# Patient Record
Sex: MALE | Race: WHITE | NOT HISPANIC OR LATINO | Employment: STUDENT | URBAN - METROPOLITAN AREA
[De-identification: names, ages, dates, MRNs, and addresses within clinical notes are randomized per-mention and may not be internally consistent; named-entity substitution may affect disease eponyms.]

---

## 2023-08-09 ENCOUNTER — OFFICE VISIT (OUTPATIENT)
Dept: GASTROENTEROLOGY | Facility: CLINIC | Age: 23
End: 2023-08-09
Payer: COMMERCIAL

## 2023-08-09 VITALS
DIASTOLIC BLOOD PRESSURE: 70 MMHG | SYSTOLIC BLOOD PRESSURE: 110 MMHG | TEMPERATURE: 96 F | HEIGHT: 70 IN | HEART RATE: 71 BPM | WEIGHT: 199.2 LBS | OXYGEN SATURATION: 96 % | BODY MASS INDEX: 28.52 KG/M2

## 2023-08-09 DIAGNOSIS — R19.4 CHANGE IN BOWEL HABITS: ICD-10-CM

## 2023-08-09 DIAGNOSIS — R10.9 ABDOMINAL PRESSURE: Chronic | ICD-10-CM

## 2023-08-09 DIAGNOSIS — K58.0 IRRITABLE BOWEL SYNDROME WITH DIARRHEA: Primary | Chronic | ICD-10-CM

## 2023-08-09 DIAGNOSIS — R10.9 ABDOMINAL CRAMPING: Chronic | ICD-10-CM

## 2023-08-09 PROCEDURE — 99204 OFFICE O/P NEW MOD 45 MIN: CPT | Performed by: NURSE PRACTITIONER

## 2023-08-09 RX ORDER — ERGOCALCIFEROL (VITAMIN D2) 10 MCG
400 TABLET ORAL DAILY
COMMUNITY

## 2023-08-09 NOTE — PROGRESS NOTES
"Chief Complaint   Patient presents with    change in bowel habits         History of Present Illness  23-year-old male presents the office today for evaluation of change in bowel habits. He reports that a couple of months ago he had a bad pressure on the sides of his abdomen accompanied with diarrhea. Now he feels the sensation that he has to have a BM, but sometimes he has a BM and other times he has to wait a significant amount of time before he has a BM. He reports that at times he does not feel that he fully evacuates. He feels as if he is holding in pressure at times. He cannot sit comfortably for period of time and cannot get comfortable. He reports some cramping. He denies any rectal bleeding aside from having to wipe too much-which is rare per his report. He reports some abdominal pressure but no pain. He reports having a CT scan a couple of months ago that was normal. This was performed at [facility] and we will request.     He started a fiber supplement about 1-2 months ago consistently with no change in symptoms but has continued use. He reports having a BM more than once per day and can have up to 2-4 BMs per day. Stool consistency can be normal to loose. He has not tried a probiotic. He denies any family history of colon cancer or colon polyps. He reports a history of IBS in a half sister. He denies any weight loss. He reports an increase in his exercise and is going to the gym 3-4 times per week. He has been trying to eat a little healthier.     He denies any upper GI symptoms such as nausea, vomiting, heartburn, reflux, or dysphagia.        Result Review :       COVID-19,[facility] ROUTINE, NP/OP SWAB IN TRANSPORT MEDIA OR ESWAB 72 HR TAT - , (09/02/2020 10:03)     Vital Signs:   /70   Pulse 71   Temp 96 øF (35.6 øC)   Ht 177.8 cm (70\")   Wt 90.4 kg (199 lb 3.2 oz)   SpO2 96%   BMI 28.58 kg/mý     Body mass index is 28.58 kg/mý.     Physical Exam  Vitals reviewed.   Constitutional:     "   Appearance: Normal appearance.   HENT:      Head: Normocephalic.      Nose: Nose normal.      Mouth/Throat:      Mouth: Mucous membranes are moist.   Eyes:      General: No scleral icterus.     Extraocular Movements: Extraocular movements intact.   Cardiovascular:      Rate and Rhythm: Normal rate and regular rhythm.      Pulses: Normal pulses.      Heart sounds: Normal heart sounds.   Pulmonary:      Effort: Pulmonary effort is normal. No respiratory distress.      Breath sounds: Normal breath sounds.   Abdominal:      General: Abdomen is flat. Bowel sounds are normal. There is no distension.      Palpations: Abdomen is soft. There is no mass.      Tenderness: There is no abdominal tenderness. There is no guarding.   Musculoskeletal:         General: Normal range of motion.      Cervical back: Normal range of motion and neck supple.   Skin:     General: Skin is warm and dry.      Coloration: Skin is not jaundiced.   Neurological:      General: No focal deficit present.      Mental Status: He is alert and oriented to person, place, and time.   Psychiatric:         Mood and Affect: Mood normal.         Behavior: Behavior normal.         Thought Content: Thought content normal.         Judgment: Judgment normal.     Assessment and Plan    Diagnoses and all orders for this visit:    1. Irritable bowel syndrome with diarrhea (Primary)    2. Abdominal pressure    3. Abdominal cramping    4. Change in bowel habits    Other orders  -     riFAXIMin (Xifaxan) 550 MG tablet; Take 1 tablet by mouth 3 (Three) Times a Day for 14 days.  Dispense: 42 tablet; Refill: 0           Patient Instructions   Continue fiber supplement.     2.   We will place you on a course of Xifaxan. This is a gut specific antibiotic that helps in the treatment of irritable bowel syndrome with diarrhea. You will take 1 tab 3 x daily x 14 days. Coupon copay card provided. Please visit [employer] to fill out the required form to receive your medication  for $0 or at a very reduced cost. You will then need to give your coupon card to the pharmacy staff when picking up your medication.     3.  If Xifaxan is not covered we will plan to trial dicyclomine.    4.   Recommend office follow up in 3 months for reassessment of symptoms or sooner should symptoms worsen or fail to improve.       Discussion  Based upon the patient's report of symptoms it appears that he has some irritable bowel syndrome at play.  We will place him on a course of Xifaxan for management of symptoms.  We have also recommend he continue daily fiber supplement.  If for some reason Xifaxan is not covered we would recommend that he start dicyclomine.  Plan for office follow-up in 3 months for reassessment of symptoms, or sooner should symptoms worsen or fail to improve.  We will also request the patient's CT scan from [facility] imaging for our review.  Patient verbalized understanding of above plan of care and is in agreement.  All questions answered and support provided.    EMR Dragon/Transcription Disclaimer:  This document has been Dictated utilizing Dragon dictation.

## 2023-08-09 NOTE — PATIENT INSTRUCTIONS
Continue fiber supplement.     2.   We will place you on a course of Xifaxan. This is a gut specific antibiotic that helps in the treatment of irritable bowel syndrome with diarrhea. You will take 1 tab 3 x daily x 14 days. Coupon copay card provided. Please visit [employer] to fill out the required form to receive your medication for $0 or at a very reduced cost. You will then need to give your coupon card to the pharmacy staff when picking up your medication.     3.  If Xifaxan is not covered we will plan to trial dicyclomine.    4.   Recommend office follow up in 3 months for reassessment of symptoms or sooner should symptoms worsen or fail to improve.

## 2023-08-21 RX ORDER — RIFAXIMIN 550 MG/1
TABLET ORAL
Qty: 42 TABLET | Refills: 0 | OUTPATIENT
Start: 2023-08-21

## 2023-09-13 ENCOUNTER — TELEPHONE (OUTPATIENT)
Dept: GASTROENTEROLOGY | Facility: CLINIC | Age: 23
End: 2023-09-13
Payer: COMMERCIAL

## 2023-09-13 NOTE — TELEPHONE ENCOUNTER
Hub staff attempted to follow warm transfer process and was unsuccessful     Caller: NIMISHA - DR MENDOZA' OFFICE    Relationship to patient:     Best call back number: [phone]    Patient is needing: DR MENDOZA IS NEEDING DR VANEGAS TO CALL HIM ON HIS CELL # [phone] REGARDING PT: GRACIE THACKER. PT SAW TEOFILO CALISTA AT [place] [facility] AND PRESCRIBED HIM MEDS THAT HE'S STILL HAVING ISSUES WITH. DR MENDOZA IS WANTING TO SEE IF DR VANEGAS CAN SEE HIM ASAP.

## 2023-09-13 NOTE — TELEPHONE ENCOUNTER
I don't have anywhere to work this gentleman in unfortunately- Dr Woodruff supervises Barbra Martin and would be the MD to work this patient in with typically as [place] is a separate office

## 2023-09-13 NOTE — TELEPHONE ENCOUNTER
Called Dr Singletary ' office and spoke with Kandice and advised of Dr Arroyo's note. Number to Dr Woodruff's office provided [phone]

## 2023-10-10 ENCOUNTER — OFFICE VISIT (OUTPATIENT)
Dept: GASTROENTEROLOGY | Facility: CLINIC | Age: 23
End: 2023-10-10
Payer: COMMERCIAL

## 2023-10-10 ENCOUNTER — PREP FOR SURGERY (OUTPATIENT)
Dept: SURGERY | Facility: SURGERY CENTER | Age: 23
End: 2023-10-10
Payer: COMMERCIAL

## 2023-10-10 VITALS
BODY MASS INDEX: 27.66 KG/M2 | DIASTOLIC BLOOD PRESSURE: 64 MMHG | HEART RATE: 70 BPM | OXYGEN SATURATION: 98 % | HEIGHT: 70 IN | WEIGHT: 193.2 LBS | TEMPERATURE: 97.3 F | SYSTOLIC BLOOD PRESSURE: 112 MMHG

## 2023-10-10 DIAGNOSIS — R19.4 CHANGE IN BOWEL HABITS: Primary | ICD-10-CM

## 2023-10-10 DIAGNOSIS — R10.9 ABDOMINAL PRESSURE: ICD-10-CM

## 2023-10-10 PROCEDURE — 99214 OFFICE O/P EST MOD 30 MIN: CPT | Performed by: INTERNAL MEDICINE

## 2023-10-10 RX ORDER — CYANOCOBALAMIN, ISOPROPYL ALCOHOL 1000MCG/ML
KIT INJECTION
COMMUNITY

## 2023-10-10 RX ORDER — SODIUM CHLORIDE 0.9 % (FLUSH) 0.9 %
10 SYRINGE (ML) INJECTION AS NEEDED
OUTPATIENT
Start: 2023-10-10

## 2023-10-10 RX ORDER — SODIUM CHLORIDE, SODIUM LACTATE, POTASSIUM CHLORIDE, CALCIUM CHLORIDE 600; 310; 30; 20 MG/100ML; MG/100ML; MG/100ML; MG/100ML
30 INJECTION, SOLUTION INTRAVENOUS CONTINUOUS PRN
OUTPATIENT
Start: 2023-10-10

## 2023-10-10 RX ORDER — SODIUM CHLORIDE 0.9 % (FLUSH) 0.9 %
3 SYRINGE (ML) INJECTION EVERY 12 HOURS SCHEDULED
OUTPATIENT
Start: 2023-10-10

## 2023-10-10 NOTE — PROGRESS NOTES
"Chief Complaint   Patient presents with    Change in bowel habits           History of Present Illness  Patient today for follow-up.  Patient has a history of abdominal discomfort.  He had a CT [place] which was reportedly negative.    Patient presents today with concerns about change in bowel habits. Symptoms began around 6 months ago and have worsened. Patient reports he has had some left lower quadrant abdominal pressure. Reports tenesmus. Reports frequent urge to have a bowel movement. Denies blood or mucus in stools. Denies diarrhea.    His father is with him for today's visit and reports his symptoms are disrupting him at work. His father reports patient has a history of OCD. He does not feel anxiety worsens his symptoms.    He tried fiber and probiotics. He reports no change in symptoms with these treatments.    Denies nausea or vomiting. Denies heartburn.    Denies family history of GI Disorders. No prior abdominal surgeries.       Result Review :       Progress Notes by Barbra Martin APRN (08/09/2023 08:15)         Vital Signs:   /64   Pulse 70   Temp 97.3 øF (36.3 øC)   Ht 177.8 cm (70\")   Wt 87.6 kg (193 lb 3.2 oz)   SpO2 98%   BMI 27.72 kg/mý     Body mass index is 27.72 kg/mý.     Physical Exam  Constitutional:       Appearance: Normal appearance.   Pulmonary:      Effort: Pulmonary effort is normal.   Abdominal:      Palpations: Abdomen is soft.   Neurological:      Mental Status: He is oriented to person, place, and time.           Assessment and Plan    Diagnoses and all orders for this visit:    1. Change in bowel habits (Primary)    2. Abdominal pressure         BRIEF SUMMARY  Patient here for follow-up.  He has continued issues with feeling of incomplete defecation and abdominal pressure in the left side of the abdomen.  We will proceed with a colonoscopy to rule out any structural abnormality.  He will continue fiber.    I have reviewed and confirmed the accuracy of the HPI " and Assessment and Plan as documented by the APRN NICHO Jang        Follow Up   Return for Follow up to review results after testing complete.    Patient Instructions   Schedule colonoscopy for further evaluation.       Documentation by Nathalie TORRE acting as a scribe in the following sections on behalf of the billable provider: HPI, ROS, assessment, & plan.

## 2023-10-10 NOTE — H&P (VIEW-ONLY)
"Chief Complaint   Patient presents with    Change in bowel habits           History of Present Illness  Patient today for follow-up.  Patient has a history of abdominal discomfort.  He had a CT [place] which was reportedly negative.    Patient presents today with concerns about change in bowel habits. Symptoms began around 6 months ago and have worsened. Patient reports he has had some left lower quadrant abdominal pressure. Reports tenesmus. Reports frequent urge to have a bowel movement. Denies blood or mucus in stools. Denies diarrhea.    His father is with him for today's visit and reports his symptoms are disrupting him at work. His father reports patient has a history of OCD. He does not feel anxiety worsens his symptoms.    He tried fiber and probiotics. He reports no change in symptoms with these treatments.    Denies nausea or vomiting. Denies heartburn.    Denies family history of GI Disorders. No prior abdominal surgeries.       Result Review :       Progress Notes by Barbra Martin APRN (08/09/2023 08:15)         Vital Signs:   /64   Pulse 70   Temp 97.3 °F (36.3 °C)   Ht 177.8 cm (70\")   Wt 87.6 kg (193 lb 3.2 oz)   SpO2 98%   BMI 27.72 kg/m²     Body mass index is 27.72 kg/m².     Physical Exam  Constitutional:       Appearance: Normal appearance.   Pulmonary:      Effort: Pulmonary effort is normal.   Abdominal:      Palpations: Abdomen is soft.   Neurological:      Mental Status: He is oriented to person, place, and time.           Assessment and Plan    Diagnoses and all orders for this visit:    1. Change in bowel habits (Primary)    2. Abdominal pressure         BRIEF SUMMARY  Patient here for follow-up.  He has continued issues with feeling of incomplete defecation and abdominal pressure in the left side of the abdomen.  We will proceed with a colonoscopy to rule out any structural abnormality.  He will continue fiber.    I have reviewed and confirmed the accuracy of the HPI " and Assessment and Plan as documented by the APRN NICHO Jang        Follow Up   Return for Follow up to review results after testing complete.    Patient Instructions   Schedule colonoscopy for further evaluation.       Documentation by Nathalie TORRE acting as a scribe in the following sections on behalf of the billable provider: HPI, ROS, assessment, & plan.

## 2023-10-12 ENCOUNTER — TELEPHONE (OUTPATIENT)
Dept: GASTROENTEROLOGY | Facility: CLINIC | Age: 23
End: 2023-10-12
Payer: COMMERCIAL

## 2023-10-16 ENCOUNTER — TELEPHONE (OUTPATIENT)
Dept: GASTROENTEROLOGY | Facility: CLINIC | Age: 23
End: 2023-10-16
Payer: COMMERCIAL

## 2023-10-16 PROBLEM — R10.9 ABDOMINAL PRESSURE: Status: ACTIVE | Noted: 2023-10-10

## 2023-10-16 PROBLEM — R19.4 CHANGE IN BOWEL HABITS: Status: ACTIVE | Noted: 2023-10-10

## 2023-10-16 NOTE — TELEPHONE ENCOUNTER
I spoke to the mother and rescheduled her son/the patient to 10/19/2023 at 12:30p with Dr. Woodruff for cls.

## 2023-10-16 NOTE — TELEPHONE ENCOUNTER
Provider: DR CARD    Caller: GRACIE THACKER    Relationship to Patient: FATHER    Phone Number: [phone] (KATIE)    Reason for Call: PT DAD CALLED IN TO SEE IF WE ARE ABLE TO PROVIDE PT WITH NOTE TO BE OFF WORK FOR PROCEDURE, WANTING UPDATE. AMARI GAVE WIFE/PT MOM.

## 2023-10-16 NOTE — TELEPHONE ENCOUNTER
CLS is on 10/19/2023 at 12:45 pm with arrival time at 11:45 am. [facility]. For the letter.

## 2023-10-17 NOTE — TELEPHONE ENCOUNTER
Correction: Arrival time 11:15 am.  I called patient to inform about letter being ready, no answer. I left a VM.

## 2023-10-19 ENCOUNTER — ANESTHESIA (OUTPATIENT)
Dept: SURGERY | Facility: SURGERY CENTER | Age: 23
End: 2023-10-19
Payer: COMMERCIAL

## 2023-10-19 ENCOUNTER — HOSPITAL ENCOUNTER (OUTPATIENT)
Facility: SURGERY CENTER | Age: 23
Setting detail: HOSPITAL OUTPATIENT SURGERY
Discharge: HOME | End: 2023-10-19
Attending: INTERNAL MEDICINE | Admitting: INTERNAL MEDICINE
Payer: COMMERCIAL

## 2023-10-19 ENCOUNTER — ANESTHESIA EVENT (OUTPATIENT)
Dept: SURGERY | Facility: SURGERY CENTER | Age: 23
End: 2023-10-19
Payer: COMMERCIAL

## 2023-10-19 VITALS
TEMPERATURE: 98.6 F | SYSTOLIC BLOOD PRESSURE: 100 MMHG | BODY MASS INDEX: 27.06 KG/M2 | RESPIRATION RATE: 16 BRPM | OXYGEN SATURATION: 95 % | HEART RATE: 61 BPM | HEIGHT: 70 IN | DIASTOLIC BLOOD PRESSURE: 50 MMHG | WEIGHT: 189 LBS

## 2023-10-19 DIAGNOSIS — R10.9 ABDOMINAL PRESSURE: ICD-10-CM

## 2023-10-19 DIAGNOSIS — R19.4 CHANGE IN BOWEL HABITS: ICD-10-CM

## 2023-10-19 PROCEDURE — 45380 COLONOSCOPY AND BIOPSY: CPT | Performed by: INTERNAL MEDICINE

## 2023-10-19 PROCEDURE — 88305 TISSUE EXAM BY PATHOLOGIST: CPT | Performed by: INTERNAL MEDICINE

## 2023-10-19 PROCEDURE — 25010000002 PROPOFOL 1000 MG/100ML EMULSION: Performed by: ANESTHESIOLOGY

## 2023-10-19 PROCEDURE — 25810000003 LACTATED RINGERS PER 1000 ML: Performed by: ANESTHESIOLOGY

## 2023-10-19 PROCEDURE — 25010000002 PROPOFOL 10 MG/ML EMULSION: Performed by: ANESTHESIOLOGY

## 2023-10-19 RX ORDER — SODIUM CHLORIDE, SODIUM LACTATE, POTASSIUM CHLORIDE, CALCIUM CHLORIDE 600; 310; 30; 20 MG/100ML; MG/100ML; MG/100ML; MG/100ML
INJECTION, SOLUTION INTRAVENOUS CONTINUOUS PRN
Status: DISCONTINUED | OUTPATIENT
Start: 2023-10-19 | End: 2023-10-19 | Stop reason: SURG

## 2023-10-19 RX ORDER — SODIUM CHLORIDE 0.9 % (FLUSH) 0.9 %
10 SYRINGE (ML) INJECTION AS NEEDED
Status: DISCONTINUED | OUTPATIENT
Start: 2023-10-19 | End: 2023-10-19 | Stop reason: HOSPADM

## 2023-10-19 RX ORDER — PROPOFOL 10 MG/ML
INJECTION, EMULSION INTRAVENOUS AS NEEDED
Status: DISCONTINUED | OUTPATIENT
Start: 2023-10-19 | End: 2023-10-19 | Stop reason: SURG

## 2023-10-19 RX ORDER — LIDOCAINE HYDROCHLORIDE 20 MG/ML
INJECTION, SOLUTION INFILTRATION; PERINEURAL AS NEEDED
Status: DISCONTINUED | OUTPATIENT
Start: 2023-10-19 | End: 2023-10-19 | Stop reason: SURG

## 2023-10-19 RX ORDER — SODIUM CHLORIDE 0.9 % (FLUSH) 0.9 %
3 SYRINGE (ML) INJECTION EVERY 12 HOURS SCHEDULED
Status: DISCONTINUED | OUTPATIENT
Start: 2023-10-19 | End: 2023-10-19 | Stop reason: HOSPADM

## 2023-10-19 RX ORDER — SODIUM CHLORIDE, SODIUM LACTATE, POTASSIUM CHLORIDE, CALCIUM CHLORIDE 600; 310; 30; 20 MG/100ML; MG/100ML; MG/100ML; MG/100ML
30 INJECTION, SOLUTION INTRAVENOUS CONTINUOUS PRN
Status: DISCONTINUED | OUTPATIENT
Start: 2023-10-19 | End: 2023-10-19 | Stop reason: HOSPADM

## 2023-10-19 RX ADMIN — PROPOFOL 160 MG: 10 INJECTION, EMULSION INTRAVENOUS at 14:15

## 2023-10-19 RX ADMIN — LIDOCAINE HYDROCHLORIDE 50 MG: 20 INJECTION, SOLUTION INFILTRATION; PERINEURAL at 14:14

## 2023-10-19 RX ADMIN — PROPOFOL 300 MCG/KG/MIN: 10 INJECTION, EMULSION INTRAVENOUS at 14:15

## 2023-10-19 RX ADMIN — SODIUM CHLORIDE, SODIUM LACTATE, POTASSIUM CHLORIDE, AND CALCIUM CHLORIDE: .6; .31; .03; .02 INJECTION, SOLUTION INTRAVENOUS at 14:13

## 2023-10-19 NOTE — ANESTHESIA POSTPROCEDURE EVALUATION
"Patient: Deshawn Villafana    Procedure Summary       Date: 10/19/23 Room / Location: [facility]    Anesthesia Start: 1413 Anesthesia Stop: 1430    Procedure: COLONOSCOPY Diagnosis:       Change in bowel habits      Abdominal pressure      (Change in bowel habits [R19.4])      (Abdominal pressure [R10.9])    Surgeons: Bill Woodruff MD Provider: Evelina Bhandari MD    Anesthesia Type: MAC ASA Status: 1            Anesthesia Type: MAC    Vitals  Vitals Value Taken Time   /50 10/19/23 1446   Temp 37 °C (98.6 °F) 10/19/23 1430   Pulse 61 10/19/23 1447   Resp 16 10/19/23 1445   SpO2 97 % 10/19/23 1447   Vitals shown include unfiled device data.        Post Anesthesia Care and Evaluation    Patient location during evaluation: bedside  Patient participation: complete - patient participated  Level of consciousness: awake  Pain management: adequate    Airway patency: patent  Anesthetic complications: No anesthetic complications    Cardiovascular status: acceptable  Respiratory status: acceptable  Hydration status: acceptable    Comments: /50 (BP Location: Left arm, Patient Position: Lying)   Pulse 61   Temp 37 °C (98.6 °F) (Temporal)   Resp 16   Ht 177.8 cm (70\")   Wt 85.7 kg (189 lb)   SpO2 95%   BMI 27.12 kg/m²     "

## 2023-10-19 NOTE — ANESTHESIA PREPROCEDURE EVALUATION
Anesthesia Evaluation     Patient summary reviewed and Nursing notes reviewed   NPO Solid Status: > 8 hours  NPO Liquid Status: > 2 hours           Airway   Mallampati: II  TM distance: >3 FB  Neck ROM: full  Dental      Pulmonary - negative pulmonary ROS   Cardiovascular - negative cardio ROS  Exercise tolerance: good (4-7 METS)    Rhythm: regular  Rate: normal        Neuro/Psych- negative ROS  GI/Hepatic/Renal/Endo - negative ROS     Musculoskeletal (-) negative ROS    Abdominal    Substance History - negative use     OB/GYN negative ob/gyn ROS         Other                    Anesthesia Plan    ASA 1     MAC     intravenous induction     Anesthetic plan, risks, benefits, and alternatives have been provided, discussed and informed consent has been obtained with: patient.    CODE STATUS:

## 2023-10-23 ENCOUNTER — TELEPHONE (OUTPATIENT)
Dept: GASTROENTEROLOGY | Facility: CLINIC | Age: 23
End: 2023-10-23
Payer: COMMERCIAL

## 2023-10-23 LAB
LAB AP CASE REPORT: NORMAL
LAB AP CLINICAL INFORMATION: NORMAL
PATH REPORT.FINAL DX SPEC: NORMAL
PATH REPORT.GROSS SPEC: NORMAL

## 2023-10-23 NOTE — TELEPHONE ENCOUNTER
Caller: KALPESH THACKER    Relationship: Mother    Best call back number: [phone]     Caller requesting test results: MOTHER    What test was performed: COLONOSCOPY     When was the test performed: 10/19/23    Where was the test performed: [facility]

## 2023-10-23 NOTE — TELEPHONE ENCOUNTER
Patient left a Voice Message requesting a call back with last scope results.    [phone]    If pt does not answer (probably at work) his mother, who left a VM too, can be reached at [phone].

## 2023-10-26 ENCOUNTER — TELEPHONE (OUTPATIENT)
Dept: GASTROENTEROLOGY | Facility: CLINIC | Age: 23
End: 2023-10-26
Payer: COMMERCIAL

## 2023-10-26 DIAGNOSIS — K62.89 RECTAL DISCOMFORT: Primary | ICD-10-CM

## 2023-10-26 RX ORDER — HYDROCORTISONE ACETATE 25 MG/1
25 SUPPOSITORY RECTAL 2 TIMES DAILY PRN
Qty: 20 SUPPOSITORY | Refills: 1 | Status: SHIPPED | OUTPATIENT
Start: 2023-10-26

## 2023-10-26 NOTE — TELEPHONE ENCOUNTER
Per Dr. Woodruff:    I had considered Canasa suppositories but the biopsies were negative for inflammation.  He may utilize Anusol HC suppositories or Analpram-HC for rectal discomfort.       Called patient and discussed note from Dr. Woodruff. Patient agreeable to plan of care. Rx for Anusol HC suppositories sent to patient's pharmacy/pending approval. david

## 2023-10-26 NOTE — TELEPHONE ENCOUNTER
"Patient called, asking about \"a medicine\" that was supposed to be called in for him after his colonoscopy. He said it was mentioned to his mom the day of his procedure, but he said there was never a name of a medication mentioned. His answers were vague. After questioning patient regarding the reason for the request for medicine, patient confirmed that it is for rectal discomfort; he would not describe the discomfort, but said certain \"factors\" affect it (he did confirm that having a bowel movement was a factor that affected it). He would like to know what medicine was recommended. Please advise.  "

## 2024-02-28 ENCOUNTER — TELEPHONE (OUTPATIENT)
Dept: GASTROENTEROLOGY | Facility: CLINIC | Age: 24
End: 2024-02-28
Payer: COMMERCIAL

## 2024-02-28 DIAGNOSIS — K64.9 HEMORRHOIDS, UNSPECIFIED HEMORRHOID TYPE: Primary | ICD-10-CM

## 2024-02-28 NOTE — TELEPHONE ENCOUNTER
Pt's mother called on [facility].  Requesting to have band procedure for hemorrhoids. Referral needed. Thank you

## 2024-04-08 ENCOUNTER — OFFICE VISIT (OUTPATIENT)
Dept: GASTROENTEROLOGY | Facility: CLINIC | Age: 24
End: 2024-04-08
Payer: COMMERCIAL

## 2024-04-08 VITALS
DIASTOLIC BLOOD PRESSURE: 70 MMHG | SYSTOLIC BLOOD PRESSURE: 108 MMHG | OXYGEN SATURATION: 97 % | HEART RATE: 72 BPM | BODY MASS INDEX: 27.53 KG/M2 | TEMPERATURE: 98.4 F | HEIGHT: 70 IN | WEIGHT: 192.3 LBS

## 2024-04-08 DIAGNOSIS — K64.9 HEMORRHOIDS, UNSPECIFIED HEMORRHOID TYPE: Primary | ICD-10-CM

## 2024-04-08 DIAGNOSIS — K64.9 HEMORRHOIDS, UNSPECIFIED HEMORRHOID TYPE: ICD-10-CM

## 2024-04-08 DIAGNOSIS — R19.4 CHANGE IN BOWEL HABITS: ICD-10-CM

## 2024-04-08 DIAGNOSIS — K62.89 RECTAL DISCOMFORT: ICD-10-CM

## 2024-04-08 DIAGNOSIS — R14.0 BLOATING: Primary | ICD-10-CM

## 2024-04-08 DIAGNOSIS — R14.0 BLOATING: ICD-10-CM

## 2024-04-08 PROCEDURE — 99214 OFFICE O/P EST MOD 30 MIN: CPT | Performed by: INTERNAL MEDICINE

## 2024-04-08 RX ORDER — HYDROCORTISONE ACETATE PRAMOXINE HCL 2.5; 1 G/100G; G/100G
CREAM TOPICAL
Qty: 30 G | Refills: 1 | Status: SHIPPED | OUTPATIENT
Start: 2024-04-08

## 2024-04-30 DIAGNOSIS — R14.0 BLOATING: ICD-10-CM

## 2024-05-02 DIAGNOSIS — R19.4 CHANGE IN BOWEL HABITS: Primary | ICD-10-CM

## 2024-05-02 RX ORDER — METRONIDAZOLE 250 MG/1
250 TABLET ORAL 3 TIMES DAILY
Qty: 30 TABLET | Refills: 0 | Status: SHIPPED | OUTPATIENT
Start: 2024-05-02 | End: 2024-05-12

## 2024-05-17 DIAGNOSIS — R19.4 CHANGE IN BOWEL HABITS: ICD-10-CM

## 2024-05-17 RX ORDER — RIFAXIMIN 550 MG/1
TABLET ORAL
Qty: 42 TABLET | Refills: 0 | Status: SHIPPED | OUTPATIENT
Start: 2024-05-17

## 2025-02-14 ENCOUNTER — HOSPITAL ENCOUNTER (EMERGENCY)
Facility: HOSPITAL | Age: 25
Discharge: HOME | End: 2025-02-14
Attending: EMERGENCY MEDICINE
Payer: MEDICAID

## 2025-02-14 ENCOUNTER — APPOINTMENT (OUTPATIENT)
Dept: GENERAL RADIOLOGY | Facility: HOSPITAL | Age: 25
End: 2025-02-14
Payer: MEDICAID

## 2025-02-14 VITALS
HEART RATE: 81 BPM | SYSTOLIC BLOOD PRESSURE: 130 MMHG | HEIGHT: 70 IN | BODY MASS INDEX: 26.48 KG/M2 | RESPIRATION RATE: 18 BRPM | TEMPERATURE: 97.2 F | OXYGEN SATURATION: 98 % | WEIGHT: 185 LBS | DIASTOLIC BLOOD PRESSURE: 81 MMHG

## 2025-02-14 DIAGNOSIS — R07.89 ATYPICAL CHEST PAIN: Primary | ICD-10-CM

## 2025-02-14 LAB
ALBUMIN SERPL-MCNC: 4.5 G/DL (ref 3.5–5.2)
ALBUMIN/GLOB SERPL: 1.6 G/DL
ALP SERPL-CCNC: 67 U/L (ref 39–117)
ALT SERPL W P-5'-P-CCNC: 64 U/L (ref 1–41)
ANION GAP SERPL CALCULATED.3IONS-SCNC: 9 MMOL/L (ref 5–15)
AST SERPL-CCNC: 33 U/L (ref 1–40)
BASOPHILS # BLD AUTO: 0.04 10*3/MM3 (ref 0–0.2)
BASOPHILS NFR BLD AUTO: 0.6 % (ref 0–1.5)
BILIRUB SERPL-MCNC: 0.4 MG/DL (ref 0–1.2)
BUN SERPL-MCNC: 15 MG/DL (ref 6–20)
BUN/CREAT SERPL: 14.2 (ref 7–25)
CALCIUM SPEC-SCNC: 9.4 MG/DL (ref 8.6–10.5)
CHLORIDE SERPL-SCNC: 103 MMOL/L (ref 98–107)
CO2 SERPL-SCNC: 24 MMOL/L (ref 22–29)
CREAT SERPL-MCNC: 1.06 MG/DL (ref 0.76–1.27)
DEPRECATED RDW RBC AUTO: 38.2 FL (ref 37–54)
EGFRCR SERPLBLD CKD-EPI 2021: 99.9 ML/MIN/1.73
EOSINOPHIL # BLD AUTO: 0.3 10*3/MM3 (ref 0–0.4)
EOSINOPHIL NFR BLD AUTO: 4.3 % (ref 0.3–6.2)
ERYTHROCYTE [DISTWIDTH] IN BLOOD BY AUTOMATED COUNT: 12.5 % (ref 12.3–15.4)
GLOBULIN UR ELPH-MCNC: 2.8 GM/DL
GLUCOSE SERPL-MCNC: 95 MG/DL (ref 65–99)
HCT VFR BLD AUTO: 47.2 % (ref 37.5–51)
HGB BLD-MCNC: 16.1 G/DL (ref 13–17.7)
HOLD SPECIMEN: NORMAL
HOLD SPECIMEN: NORMAL
IMM GRANULOCYTES # BLD AUTO: 0.04 10*3/MM3 (ref 0–0.05)
IMM GRANULOCYTES NFR BLD AUTO: 0.6 % (ref 0–0.5)
LYMPHOCYTES # BLD AUTO: 2.52 10*3/MM3 (ref 0.7–3.1)
LYMPHOCYTES NFR BLD AUTO: 35.9 % (ref 19.6–45.3)
MCH RBC QN AUTO: 29 PG (ref 26.6–33)
MCHC RBC AUTO-ENTMCNC: 34.1 G/DL (ref 31.5–35.7)
MCV RBC AUTO: 84.9 FL (ref 79–97)
MONOCYTES # BLD AUTO: 0.52 10*3/MM3 (ref 0.1–0.9)
MONOCYTES NFR BLD AUTO: 7.4 % (ref 5–12)
NEUTROPHILS NFR BLD AUTO: 3.6 10*3/MM3 (ref 1.7–7)
NEUTROPHILS NFR BLD AUTO: 51.2 % (ref 42.7–76)
NRBC BLD AUTO-RTO: 0 /100 WBC (ref 0–0.2)
PLATELET # BLD AUTO: 187 10*3/MM3 (ref 140–450)
PMV BLD AUTO: 10 FL (ref 6–12)
POTASSIUM SERPL-SCNC: 3.6 MMOL/L (ref 3.5–5.2)
PROT SERPL-MCNC: 7.3 G/DL (ref 6–8.5)
QT INTERVAL: 388 MS
QTC INTERVAL: 404 MS
RBC # BLD AUTO: 5.56 10*6/MM3 (ref 4.14–5.8)
SODIUM SERPL-SCNC: 136 MMOL/L (ref 136–145)
TROPONIN T SERPL HS-MCNC: <6 NG/L
WBC NRBC COR # BLD AUTO: 7.02 10*3/MM3 (ref 3.4–10.8)
WHOLE BLOOD HOLD COAG: NORMAL
WHOLE BLOOD HOLD SPECIMEN: NORMAL

## 2025-02-14 PROCEDURE — 80053 COMPREHEN METABOLIC PANEL: CPT | Performed by: EMERGENCY MEDICINE

## 2025-02-14 PROCEDURE — 85025 COMPLETE CBC W/AUTO DIFF WBC: CPT | Performed by: EMERGENCY MEDICINE

## 2025-02-14 PROCEDURE — 93005 ELECTROCARDIOGRAM TRACING: CPT | Performed by: EMERGENCY MEDICINE

## 2025-02-14 PROCEDURE — 93010 ELECTROCARDIOGRAM REPORT: CPT | Performed by: INTERNAL MEDICINE

## 2025-02-14 PROCEDURE — 99283 EMERGENCY DEPT VISIT LOW MDM: CPT

## 2025-02-14 PROCEDURE — 93005 ELECTROCARDIOGRAM TRACING: CPT

## 2025-02-14 PROCEDURE — 84484 ASSAY OF TROPONIN QUANT: CPT | Performed by: EMERGENCY MEDICINE

## 2025-02-14 RX ORDER — FAMOTIDINE 20 MG/1
20 TABLET, FILM COATED ORAL 2 TIMES DAILY
Qty: 60 TABLET | Refills: 0 | Status: SHIPPED | OUTPATIENT
Start: 2025-02-14 | End: 2025-03-16

## 2025-02-14 RX ORDER — ASPIRIN 325 MG
325 TABLET ORAL ONCE
Status: DISCONTINUED | OUTPATIENT
Start: 2025-02-14 | End: 2025-02-14 | Stop reason: HOSPADM

## 2025-02-14 RX ORDER — SODIUM CHLORIDE 0.9 % (FLUSH) 0.9 %
10 SYRINGE (ML) INJECTION AS NEEDED
Status: DISCONTINUED | OUTPATIENT
Start: 2025-02-14 | End: 2025-02-14 | Stop reason: HOSPADM

## 2025-02-14 NOTE — ED PROVIDER NOTES
EMERGENCY DEPARTMENT ENCOUNTER    History  Chief Complaint   Patient presents with    Chest Pain       History provided by: Patient    HPI:  Chief Complaint: Chest pain    Context: Pt is a 25 y.o. male who presents with complaint of acute chest pain.  Symptoms been present for about a week and have been intermittent.  No real worsening factors, but does notice that symptoms are improved with sleep.  He notes a central to left-sided  somewhat dull pain.  Not worse with ambulation or eating.  Symptoms seem to start after having a sip of some beer.  He does not report any chronic medical problems.  No daily medications.  Non-smoker.      Active Ambulatory Problems     Diagnosis Date Noted    Change in bowel habits 10/10/2023    Abdominal pressure 10/10/2023     Resolved Ambulatory Problems     Diagnosis Date Noted    No Resolved Ambulatory Problems     Past Medical History:   Diagnosis Date    Anxiety     Hemorrhoids        Past Surgical History:   Procedure Laterality Date    COLONOSCOPY N/A 10/19/2023    Procedure: COLONOSCOPY;  Surgeon: Bill Woodruff MD;  Location: [facility] MAIN OR;  Service: Gastroenterology;  Laterality: N/A;  hemorrhoids    HERNIA REPAIR         Physical Exam  ED Triage Vitals   Temp Heart Rate Resp BP SpO2   02/14/25 0539 02/14/25 0530 02/14/25 0530 02/14/25 0538 02/14/25 0530   97.2 °F (36.2 °C) 81 18 130/81 98 %      Temp src Heart Rate Source Patient Position BP Location FiO2 (%)   -- -- -- -- --            Physical Exam  Constitutional:       Appearance: Normal appearance.   HENT:      Head: Atraumatic.   Eyes:      Conjunctiva/sclera: Conjunctivae normal.   Cardiovascular:      Rate and Rhythm: Normal rate and regular rhythm.      Heart sounds: No murmur heard.  Pulmonary:      Effort: Pulmonary effort is normal. No respiratory distress.      Breath sounds: Normal breath sounds.   Abdominal:      Tenderness: There is no abdominal tenderness. There is no guarding or rebound.   Skin:      Capillary Refill: Capillary refill takes less than 2 seconds.   Neurological:      Mental Status: He is alert and oriented to person, place, and time.         Medical Decision Making:    Differential Diagnosis includes but not limited to: ACS, pleurisy, pneumonia, pneumothorax    Medical Record Review: Reviewed PCP office visit note 1/16/2025    Labs Results:  Recent Results (from the past 24 hours)   ECG 12 Lead ED Triage Standing Order; Chest Pain    Collection Time: 02/14/25  5:35 AM   Result Value Ref Range    QT Interval 388 ms    QTC Interval 404 ms   Comprehensive Metabolic Panel    Collection Time: 02/14/25  5:49 AM    Specimen: Blood   Result Value Ref Range    Glucose 95 65 - 99 mg/dL    BUN 15 6 - 20 mg/dL    Creatinine 1.06 0.76 - 1.27 mg/dL    Sodium 136 136 - 145 mmol/L    Potassium 3.6 3.5 - 5.2 mmol/L    Chloride 103 98 - 107 mmol/L    CO2 24.0 22.0 - 29.0 mmol/L    Calcium 9.4 8.6 - 10.5 mg/dL    Total Protein 7.3 6.0 - 8.5 g/dL    Albumin 4.5 3.5 - 5.2 g/dL    ALT (SGPT) 64 (H) 1 - 41 U/L    AST (SGOT) 33 1 - 40 U/L    Alkaline Phosphatase 67 39 - 117 U/L    Total Bilirubin 0.4 0.0 - 1.2 mg/dL    Globulin 2.8 gm/dL    A/G Ratio 1.6 g/dL    BUN/Creatinine Ratio 14.2 7.0 - 25.0    Anion Gap 9.0 5.0 - 15.0 mmol/L    eGFR 99.9 >60.0 mL/min/1.73   High Sensitivity Troponin T    Collection Time: 02/14/25  5:49 AM    Specimen: Blood   Result Value Ref Range    HS Troponin T <6 <22 ng/L   Green Top (Gel)    Collection Time: 02/14/25  5:49 AM   Result Value Ref Range    Extra Tube Hold for add-ons.    Lavender Top    Collection Time: 02/14/25  5:49 AM   Result Value Ref Range    Extra Tube hold for add-on    Gold Top - SST    Collection Time: 02/14/25  5:49 AM   Result Value Ref Range    Extra Tube Hold for add-ons.    Light Blue Top    Collection Time: 02/14/25  5:49 AM   Result Value Ref Range    Extra Tube Hold for add-ons.    CBC Auto Differential    Collection Time: 02/14/25  5:49 AM    Specimen: Blood    Result Value Ref Range    WBC 7.02 3.40 - 10.80 10*3/mm3    RBC 5.56 4.14 - 5.80 10*6/mm3    Hemoglobin 16.1 13.0 - 17.7 g/dL    Hematocrit 47.2 37.5 - 51.0 %    MCV 84.9 79.0 - 97.0 fL    MCH 29.0 26.6 - 33.0 pg    MCHC 34.1 31.5 - 35.7 g/dL    RDW 12.5 12.3 - 15.4 %    RDW-SD 38.2 37.0 - 54.0 fl    MPV 10.0 6.0 - 12.0 fL    Platelets 187 140 - 450 10*3/mm3    Neutrophil % 51.2 42.7 - 76.0 %    Lymphocyte % 35.9 19.6 - 45.3 %    Monocyte % 7.4 5.0 - 12.0 %    Eosinophil % 4.3 0.3 - 6.2 %    Basophil % 0.6 0.0 - 1.5 %    Immature Grans % 0.6 (H) 0.0 - 0.5 %    Neutrophils, Absolute 3.60 1.70 - 7.00 10*3/mm3    Lymphocytes, Absolute 2.52 0.70 - 3.10 10*3/mm3    Monocytes, Absolute 0.52 0.10 - 0.90 10*3/mm3    Eosinophils, Absolute 0.30 0.00 - 0.40 10*3/mm3    Basophils, Absolute 0.04 0.00 - 0.20 10*3/mm3    Immature Grans, Absolute 0.04 0.00 - 0.05 10*3/mm3    nRBC 0.0 0.0 - 0.2 /100 WBC     Lab Comments:  My independent interpretation of the above labs: Mildly elevated ALT, I discussed this with patient he will follow-up with PCP for recheck      EKG Interpreted by me: Normal sinus rhythm, incomplete right bundle branch block, normal axis, no ST segment changes      Medications given in ED:  Medications   sodium chloride 0.9 % flush 10 mL (has no administration in time range)   aspirin tablet 325 mg (has no administration in time range)     (Social Determinants of Health): None    Rationale:  This is an overall well-appearing 25-year-old patient who is presenting today secondary to a 1 week history of intermittent chest pain.  He looks well.  He presents afebrile with unremarkable vital signs.  Clinical exam is benign.  He was evaluated with an EKG, which did not demonstrate any acute signs of ischemia.  This coupled with a negative troponin level in the setting of a low risk heart score.  I do not have any concerns for ACS.  HEART Score: 0    Patient is furthermore PERC negative for PE  PERC Rule for Pulmonary  Embolism - MDCalc  Calculated on Feb 14 2025 6:35 AM  0 criteria -> No need for further workup, as <2% chance of PE. If no criteria are positive and clinician’s pre-test probability is <15%, PERC Rule criteria are satisfied.    I wanted to further evaluate the patient with a chest x-ray to evaluate for structural causes of symptoms, but the patient declines.  Given the normal physical exam, I think this is reasonable.      Overall, his workup in the emergency room is reassuring.  Discussed with patient possible underlying causes of symptoms to include acid reflux, esophagitis, pleurisy, viral infections, etc.  We are going to trial some Pepcid given that symptoms seem to be worse with alcohol use and he is going to cut back.  He is appropriate for discharge with further outpatient reevaluation.    Progress Notes:  6:33 AM EST: I spoke with the patient about his ED work up, diagnosis and the plan for discharge. I discussed the importance of following up with his PCP. I instructed him to return to the Emergency Room for new or worsening symptoms. All of the pt's questions were answered. The patient is stable at time of discharge.      Diagnosis:  Final diagnoses:   Atypical chest pain       Follow Up:  Israel Singletary MD  [id number] [id number] [phone]    Call in 2 days  For reevaluation      Rx:  Current Discharge Medication List        START taking these medications    Details   famotidine (PEPCID) 20 MG tablet Take 1 tablet by mouth 2 (Two) Times a Day for 30 days.  Qty: 60 tablet, Refills: 0                 Parts of this note may be an electronic transcription/translation of spoken language to printed text using the Dragon dictation system        Provider Note Signed by:     Dara Zamora MD  02/14/25 0637

## (undated) DEVICE — GOWN ,SIRUS,NONREINFORCED 3XL: Brand: MEDLINE

## (undated) DEVICE — Device

## (undated) DEVICE — FLEX ADVANTAGE 1500CC: Brand: FLEX ADVANTAGE

## (undated) DEVICE — ADAPT CLN SCPE ENDO PORPOISE BX/50 DISP

## (undated) DEVICE — CANN O2 ETCO2 FITS ALL CONN CO2 SMPL A/ 7IN DISP LF

## (undated) DEVICE — KT ORCA ORCAPOD DISP STRL

## (undated) DEVICE — GOWN ISOL W/THUMB UNIV BLU BX/15